# Patient Record
Sex: FEMALE | Race: WHITE | NOT HISPANIC OR LATINO | Employment: OTHER | URBAN - NONMETROPOLITAN AREA
[De-identification: names, ages, dates, MRNs, and addresses within clinical notes are randomized per-mention and may not be internally consistent; named-entity substitution may affect disease eponyms.]

---

## 2019-11-04 ENCOUNTER — OFFICE VISIT (OUTPATIENT)
Dept: PEDIATRICS | Facility: OTHER | Age: 54
End: 2019-11-04
Attending: INTERNAL MEDICINE
Payer: COMMERCIAL

## 2019-11-04 VITALS
BODY MASS INDEX: 27.6 KG/M2 | DIASTOLIC BLOOD PRESSURE: 70 MMHG | TEMPERATURE: 98 F | WEIGHT: 150 LBS | HEIGHT: 62 IN | OXYGEN SATURATION: 99 % | HEART RATE: 94 BPM | SYSTOLIC BLOOD PRESSURE: 124 MMHG | RESPIRATION RATE: 16 BRPM

## 2019-11-04 DIAGNOSIS — E03.9 HYPOTHYROIDISM, UNSPECIFIED TYPE: Primary | ICD-10-CM

## 2019-11-04 DIAGNOSIS — M79.7 FIBROMYALGIA: ICD-10-CM

## 2019-11-04 PROCEDURE — 99203 OFFICE O/P NEW LOW 30 MIN: CPT | Performed by: INTERNAL MEDICINE

## 2019-11-04 RX ORDER — THYROID 60 MG/1
2 TABLET ORAL DAILY
COMMUNITY
Start: 2019-10-21 | End: 2019-11-04

## 2019-11-04 RX ORDER — THYROID 60 MG/1
120 TABLET ORAL DAILY
Qty: 180 TABLET | Refills: 3 | Status: SHIPPED | OUTPATIENT
Start: 2019-11-04 | End: 2020-10-19

## 2019-11-04 SDOH — HEALTH STABILITY: MENTAL HEALTH: HOW OFTEN DO YOU HAVE A DRINK CONTAINING ALCOHOL?: NEVER

## 2019-11-04 ASSESSMENT — MIFFLIN-ST. JEOR: SCORE: 1233.65

## 2019-11-04 ASSESSMENT — PAIN SCALES - GENERAL: PAINLEVEL: NO PAIN (0)

## 2019-11-04 NOTE — NURSING NOTE
Patient presents to clinic for medication questions.  Terra Brannon LPN ....................  11/4/2019   10:00 AM    No LMP recorded.  Medication Reconciliation: complete    Terra Brannon LPN  11/4/2019 10:00 AM

## 2019-11-04 NOTE — PROGRESS NOTES
Subjective  Geo Briggs is a 54 year old female who presents for refill thyroid medication.  She was diagnosed with a thyroid condition back in  after developing extreme fatigue, feeling cold, constipation, dry hair and skin.  She was intolerant of levothyroxine because it has a dairy component and she is lactose intolerant.  She was switched to Post Thyroid and had significant improvement in symptoms.  She was previously seeing a physician in [place] but they did not feel comfortable continuing to prescribe Post Thyroid any longer.  She also has a history of fibromyalgia but wonders if it was misdiagnosed as this was diagnosed prior to her thyroid diagnosis.  She is been feeling very well on NP thyroid 120 mg daily.  No heart palpitations.  No diarrhea.  No dysphasia.    Problem List/PMH: reviewed in EMR, and made relevant updates today.  Medications: reviewed in EMR, and made relevant updates today.  Allergies: reviewed in EMR, and made relevant updates today.    Social Hx:  Social History     Tobacco Use     Smoking status: Never Smoker     Smokeless tobacco: Never Used   Substance Use Topics     Alcohol use: Never     Frequency: Never     Drug use: Never     Social History     Patient does not qualify to have social determinant information on file (likely too young).   Social History Narrative    .  She works as a cook.     I reviewed social history and made relevant updates today.    Family Hx:   Family History   Problem Relation Age of Onset     Heart Disease Father 32        Heart Disease,MI,      Heart Disease Mother         Heart Disease,arteriosclerotic heart disease,     Other - See Comments Mother         Psychiatric illness,Chronic depression/peripheral vascular disease with amputation, smoker.     Hyperlipidemia Other         Hyperlipidemia,hypertriglyceridemia/Hyperlipidemia,hypercholesterolemia.     Other - See Comments Maternal Grandfather         Peripheral vascular disease,   "bilateral amputation.       Objective  Vitals: reviewed in EMR.  /70 (BP Location: Right arm, Patient Position: Sitting, Cuff Size: Adult Large)   Pulse 94   Temp 98  F (36.7  C) (Tympanic)   Resp 16   Ht 1.575 m (5' 2\")   Wt 68 kg (150 lb)   SpO2 99%   Breastfeeding? No   BMI 27.44 kg/m      Gen: Pleasant female, NAD.  HEENT: MMM  Neck: Supple.  No thyromegaly.  No thyroid nodules.  Pulm: Breathing easily  Neuro: Grossly intact  Skin: No concerning lesions.  Psychiatric: Normal affect and insight. Does not appear anxious or depressed.    Lab work reviewed via care everywhere October 17, 2019  TSH less than 0.015  Free T3 3.13  Free T4 0.55    Assessment    ICD-10-CM    1. Hypothyroidism, unspecified type E03.9 thyroid ([facility]) 60 MG tablet   2. Fibromyalgia M79.7      She appears to have iatrogenic hyperthyroidism from a blood work perspective.  Symptomatically she feels great and is having no side effects.  We discussed the risks and benefits of the use of NP thyroid and she wishes to continue its use.  I also discussed the possibility of underlying central hypothyroidism.  She reports having a negative work-up for this in the past including an ACT stim test which I am not able to see at this time.  We discussed the possibility of obtaining thyroid ultrasound and the patient declined.    Plan   -- Expected clinical course discussed   -- Medications and their side effects discussed   -- Continue NP Thyroid   -- Follow-up in 1 year    Signed, Odin Cervantes MD, FAAP, FACP  Internal Medicine & Pediatrics    "

## 2020-04-07 DIAGNOSIS — M25.50 PAIN IN JOINT, MULTIPLE SITES: ICD-10-CM

## 2020-04-07 DIAGNOSIS — E53.8 VITAMIN B12 DEFICIENCY (NON ANEMIC): ICD-10-CM

## 2020-04-07 DIAGNOSIS — E27.9 ADRENAL ABNORMALITY (H): ICD-10-CM

## 2020-04-07 DIAGNOSIS — E55.9 VITAMIN D DEFICIENCY: Primary | ICD-10-CM

## 2020-04-08 DIAGNOSIS — E53.8 BIOTIN-(PROPIONYL-COA-CARBOXYLASE) LIGASE DEFICIENCY: ICD-10-CM

## 2020-04-08 DIAGNOSIS — I15.2 ADRENAL HYPERTENSION (H): ICD-10-CM

## 2020-04-08 DIAGNOSIS — M25.50 PAIN IN JOINT, MULTIPLE SITES: ICD-10-CM

## 2020-04-08 DIAGNOSIS — E55.9 AVITAMINOSIS D: Primary | ICD-10-CM

## 2020-04-08 DIAGNOSIS — E53.8 VITAMIN B12 DEFICIENCY (NON ANEMIC): ICD-10-CM

## 2020-04-08 DIAGNOSIS — E27.9 ADRENAL HYPERTENSION (H): ICD-10-CM

## 2020-05-18 DIAGNOSIS — M25.50 PAIN IN JOINT, MULTIPLE SITES: ICD-10-CM

## 2020-05-18 DIAGNOSIS — E55.9 AVITAMINOSIS D: ICD-10-CM

## 2020-05-18 DIAGNOSIS — E27.9 ADRENAL HYPERTENSION (H): ICD-10-CM

## 2020-05-18 DIAGNOSIS — I15.2 ADRENAL HYPERTENSION (H): ICD-10-CM

## 2020-05-18 DIAGNOSIS — E53.8 VITAMIN B12 DEFICIENCY (NON ANEMIC): ICD-10-CM

## 2020-05-18 LAB
CORTIS SERPL-MCNC: 14.3 UG/DL (ref 4–22)
DEPRECATED CALCIDIOL+CALCIFEROL SERPL-MC: 74.1 NG/ML
ERYTHROCYTE [SEDIMENTATION RATE] IN BLOOD BY WESTERGREN METHOD: 8 MM/H (ref 1–15)
URATE SERPL-MCNC: 5 MG/DL (ref 4.4–7.6)
VIT B12 SERPL-MCNC: 738 PG/ML (ref 180–914)

## 2020-05-18 PROCEDURE — 82306 VITAMIN D 25 HYDROXY: CPT | Mod: ZL

## 2020-05-18 PROCEDURE — 82533 TOTAL CORTISOL: CPT | Mod: ZL

## 2020-05-18 PROCEDURE — 36415 COLL VENOUS BLD VENIPUNCTURE: CPT | Mod: ZL

## 2020-05-18 PROCEDURE — 85652 RBC SED RATE AUTOMATED: CPT | Mod: ZL

## 2020-05-18 PROCEDURE — 82627 DEHYDROEPIANDROSTERONE: CPT | Mod: ZL

## 2020-05-18 PROCEDURE — 84550 ASSAY OF BLOOD/URIC ACID: CPT | Mod: ZL

## 2020-05-18 PROCEDURE — 82607 VITAMIN B-12: CPT | Mod: ZL

## 2020-05-20 LAB — DHEA-S SERPL-MCNC: 163 UG/DL (ref 35–430)

## 2020-09-15 DIAGNOSIS — F43.9 STATE OF STRESS: ICD-10-CM

## 2020-09-15 DIAGNOSIS — I15.2 ADRENAL HYPERTENSION (H): ICD-10-CM

## 2020-09-15 DIAGNOSIS — E53.8 VITAMIN B12 DEFICIENCY (NON ANEMIC): ICD-10-CM

## 2020-09-15 DIAGNOSIS — M25.50 PAIN IN JOINT, MULTIPLE SITES: ICD-10-CM

## 2020-09-15 DIAGNOSIS — E27.9 ADRENAL HYPERTENSION (H): ICD-10-CM

## 2020-09-15 DIAGNOSIS — G93.32 CHRONIC FATIGUE SYNDROME: ICD-10-CM

## 2020-09-15 DIAGNOSIS — E27.9 ADRENAL ABNORMALITY (H): ICD-10-CM

## 2020-09-15 DIAGNOSIS — E55.9 VITAMIN D DEFICIENCY: ICD-10-CM

## 2020-09-15 LAB
CORTIS SERPL-MCNC: 9.1 UG/DL (ref 4–22)
DEPRECATED CALCIDIOL+CALCIFEROL SERPL-MC: 67.5 NG/ML
ERYTHROCYTE [SEDIMENTATION RATE] IN BLOOD BY WESTERGREN METHOD: 11 MM/H (ref 1–15)
URATE SERPL-MCNC: 5.7 MG/DL (ref 4.4–7.6)
VIT B12 SERPL-MCNC: 1206 PG/ML (ref 180–914)

## 2020-09-15 PROCEDURE — 85652 RBC SED RATE AUTOMATED: CPT | Mod: ZL

## 2020-09-15 PROCEDURE — 84550 ASSAY OF BLOOD/URIC ACID: CPT | Mod: ZL

## 2020-09-15 PROCEDURE — 36415 COLL VENOUS BLD VENIPUNCTURE: CPT | Mod: ZL

## 2020-09-15 PROCEDURE — 82306 VITAMIN D 25 HYDROXY: CPT | Mod: ZL

## 2020-09-15 PROCEDURE — 82607 VITAMIN B-12: CPT | Mod: ZL

## 2020-09-15 PROCEDURE — 82627 DEHYDROEPIANDROSTERONE: CPT | Mod: ZL

## 2020-09-15 PROCEDURE — 82533 TOTAL CORTISOL: CPT | Mod: ZL

## 2020-09-16 LAB — DHEA-S SERPL-MCNC: 169 UG/DL (ref 35–430)

## 2020-10-19 ENCOUNTER — TELEPHONE (OUTPATIENT)
Dept: PEDIATRICS | Facility: OTHER | Age: 55
End: 2020-10-19

## 2020-10-19 DIAGNOSIS — E03.9 HYPOTHYROIDISM, UNSPECIFIED TYPE: Primary | ICD-10-CM

## 2020-10-19 RX ORDER — THYROID 60 MG/1
120 TABLET ORAL DAILY
Qty: 180 TABLET | Refills: 3 | Status: SHIPPED | OUTPATIENT
Start: 2020-10-19 | End: 2021-10-04

## 2020-10-19 NOTE — TELEPHONE ENCOUNTER
Patient last saw you 11/4/2019 for a refill on thyroid medication. Patient has no PCP listed. Would you be willing to order thyroid lab work without seeing patient for an appointment?    Lorenza Angel CMA on 10/19/2020 at 12:52 PM

## 2020-10-19 NOTE — TELEPHONE ENCOUNTER
After verifying pts name and date of birth with pt, pt was notified Rx was sent to pharmacy.  Abby Jones LPN

## 2020-10-19 NOTE — TELEPHONE ENCOUNTER
Reason for call: Request a lab order.    Order requested for what kind of lab? Thyroid check  Who is your PCP?     Preferred method for responding to this message: Telephone Call    Phone number patient can be reached at: Cell number on file:    Telephone Information:   Mobile [phone]       If we cannot reach you directly, may we leave a detailed response at the number you provided? Yes

## 2020-10-21 ENCOUNTER — MYC MEDICAL ADVICE (OUTPATIENT)
Dept: PEDIATRICS | Facility: OTHER | Age: 55
End: 2020-10-21

## 2020-10-21 ENCOUNTER — TELEPHONE (OUTPATIENT)
Dept: PEDIATRICS | Facility: OTHER | Age: 55
End: 2020-10-21

## 2020-10-21 DIAGNOSIS — E03.9 HYPOTHYROIDISM, UNSPECIFIED TYPE: ICD-10-CM

## 2020-10-22 NOTE — TELEPHONE ENCOUNTER
Incorrect.  We sent 60 mg tablets, take 120 mg daily.     -- Contact pharmacist    Signed, Odin Cervantes MD, FAAP, FACP  Internal Medicine & Pediatrics

## 2020-12-27 ENCOUNTER — HEALTH MAINTENANCE LETTER (OUTPATIENT)
Age: 55
End: 2020-12-27

## 2021-09-09 ENCOUNTER — TELEPHONE (OUTPATIENT)
Dept: PEDIATRICS | Facility: OTHER | Age: 56
End: 2021-09-09

## 2021-09-09 DIAGNOSIS — E03.9 HYPOTHYROIDISM, UNSPECIFIED TYPE: Primary | ICD-10-CM

## 2021-09-09 NOTE — TELEPHONE ENCOUNTER
I called the patient and the patient stated they have enough medication to last until at least Monday. The patient did not know if Dr. Cervantes would want the patient to have labs drawn before the medication gets refilled.    Jonathon Mccabe LPN on 9/9/2021 at 4:20 PM

## 2021-09-13 NOTE — TELEPHONE ENCOUNTER
ICD-10-CM    1. Hypothyroidism, unspecified type  E03.9 TSH     T4, Free       -- lab   -- schedule ov    Signed, Odin Cervantes MD, FAAP, FACP  Internal Medicine & Pediatrics

## 2021-09-13 NOTE — TELEPHONE ENCOUNTER
Patient notified to schedule an office visit and lab work to get rx filled.  Priscila Alvarado LPN.........................9/13/2021  10:10 AM

## 2021-09-28 ENCOUNTER — LAB (OUTPATIENT)
Dept: LAB | Facility: OTHER | Age: 56
End: 2021-09-28
Attending: INTERNAL MEDICINE
Payer: COMMERCIAL

## 2021-09-28 DIAGNOSIS — E27.9 ADRENAL ABNORMALITY (H): Primary | ICD-10-CM

## 2021-09-28 DIAGNOSIS — E03.9 HYPOTHYROIDISM, UNSPECIFIED TYPE: ICD-10-CM

## 2021-09-28 LAB
T3FREE SERPL-MCNC: 3.5 PG/ML (ref 2.5–3.9)
T4 FREE SERPL-MCNC: 0.55 NG/DL (ref 0.6–1.6)
TSH SERPL DL<=0.005 MIU/L-ACNC: 0.01 MU/L (ref 0.4–4)

## 2021-09-28 PROCEDURE — 84439 ASSAY OF FREE THYROXINE: CPT | Mod: ZL

## 2021-09-28 PROCEDURE — 84481 FREE ASSAY (FT-3): CPT | Mod: ZL

## 2021-09-28 PROCEDURE — 36415 COLL VENOUS BLD VENIPUNCTURE: CPT | Mod: ZL

## 2021-09-28 PROCEDURE — 84443 ASSAY THYROID STIM HORMONE: CPT | Mod: ZL

## 2021-10-03 ENCOUNTER — HEALTH MAINTENANCE LETTER (OUTPATIENT)
Age: 56
End: 2021-10-03

## 2021-10-04 ENCOUNTER — OFFICE VISIT (OUTPATIENT)
Dept: PEDIATRICS | Facility: OTHER | Age: 56
End: 2021-10-04
Attending: INTERNAL MEDICINE
Payer: COMMERCIAL

## 2021-10-04 VITALS
HEIGHT: 62 IN | BODY MASS INDEX: 26.04 KG/M2 | TEMPERATURE: 99 F | WEIGHT: 141.5 LBS | HEART RATE: 132 BPM | SYSTOLIC BLOOD PRESSURE: 144 MMHG | RESPIRATION RATE: 20 BRPM | OXYGEN SATURATION: 97 % | DIASTOLIC BLOOD PRESSURE: 88 MMHG

## 2021-10-04 DIAGNOSIS — R00.0 TACHYCARDIA: ICD-10-CM

## 2021-10-04 DIAGNOSIS — E03.9 HYPOTHYROIDISM, UNSPECIFIED TYPE: Primary | ICD-10-CM

## 2021-10-04 PROCEDURE — 99214 OFFICE O/P EST MOD 30 MIN: CPT | Performed by: INTERNAL MEDICINE

## 2021-10-04 RX ORDER — LEVOTHYROXINE, LIOTHYRONINE 76; 18 UG/1; UG/1
120 TABLET ORAL DAILY
COMMUNITY
Start: 2020-10-22 | End: 2021-10-04

## 2021-10-04 RX ORDER — LEVOTHYROXINE, LIOTHYRONINE 76; 18 UG/1; UG/1
120 TABLET ORAL DAILY
Qty: 90 TABLET | Refills: 3 | Status: SHIPPED | OUTPATIENT
Start: 2021-10-04 | End: 2022-07-13

## 2021-10-04 ASSESSMENT — PAIN SCALES - GENERAL: PAINLEVEL: NO PAIN (0)

## 2021-10-04 ASSESSMENT — MIFFLIN-ST. JEOR: SCORE: 1177.15

## 2021-10-04 NOTE — PATIENT INSTRUCTIONS
-- No change to thyroid medicine   -- Consider pituitary MRI to workup central hypothyroidism     -- Consider EKG   -- Monitor BP and HR at home, notify Dr. Cervantes in a week or so

## 2021-10-04 NOTE — NURSING NOTE
"Patient presents to the clinic for follow up with lab.      FOOD SECURITY SCREENING QUESTIONS  Hunger Vital Signs:  Within the past 12 months we worried whether our food would run out before we got money to buy more. Never  Within the past 12 months the food we bought just didn't last and we didn't have money to get more. Never    Advance Care Directive on file? no  Advance Care Directive provided to patient? Declined.      Chief Complaint   Patient presents with     Clinic Care Coordination - Follow-up       Initial BP (!) 144/88 (BP Location: Right arm, Patient Position: Sitting, Cuff Size: Adult Regular)   Pulse (!) 132   Temp 99  F (37.2  C) (Tympanic)   Resp 20   Ht 1.562 m (5' 1.5\")   Wt 64.2 kg (141 lb 8 oz)   SpO2 97%   BMI 26.30 kg/m   Estimated body mass index is 26.3 kg/m  as calculated from the following:    Height as of this encounter: 1.562 m (5' 1.5\").    Weight as of this encounter: 64.2 kg (141 lb 8 oz).  Medication Reconciliation: complete    Jovana Lane, NICHOLAS       "

## 2021-10-04 NOTE — PROGRESS NOTES
"Subjective   Geo Briggs is a 56 year old female who presents for follow-up thyroid.  She has a history of hypothyroidism which has been stable on NP thyroid.  She cannot tolerate [place] Thyroid because it contains lactose in its formulation to which she is intolerant.  She is hoping I will say she continue her thyroid medication because she feels very well.  She does not want to go back to feeling ill before she got her medication.  She thinks her heart rate is fast today because of anxiety.  She checks her blood pressure and pulse at home and they have been normal.  She had a work-up for her thyroid a number of years ago in [place].  She does not remember if she got brain imaging.    Objective   Vitals: BP (!) 144/88 (BP Location: Right arm, Patient Position: Sitting, Cuff Size: Adult Regular)   Pulse (!) 132   Temp 99  F (37.2  C) (Tympanic)   Resp 20   Ht 1.562 m (5' 1.5\")   Wt 64.2 kg (141 lb 8 oz)   SpO2 97%   BMI 26.30 kg/m      Cardiovascular: Tachycardic, regular.  Pulmonary: Clear    Review and Analysis of Data   I personally reviewed the following:  External notes: No  Results: Yes Thyroid labs are reviewed.  I attempted to review outside labs via care everywhere no imaging results of brain are found.  Use of an independent historian: No  Independent review of a test performed by another physician: No  Discussion of management with another physician: No  Low risk of morbidity from additional diagnostic testing and/or treatment.    Assessment & Plan   1. Hypothyroidism, unspecified type  In reviewing the pattern of her thyroid labs she has a normal free T3 and only slightly reduced free T4 with significantly low TSH.  I am concerned this may represent central hypothyroidism.  We discussed the possibility for pituitary diseases including but not limited to a pituitary adenoma.  I recommended obtaining an MRI of her brain as a next step, which the patient declined at this time.  However warning " signs were discussed and prompt repeat evaluation recommended if symptoms persist or worsen.    - NP THYROID 120 MG tablet; Take 1 tablet (120 mg) by mouth daily Generic form for [place] with NO Lactose  Dispense: 90 tablet; Refill: 3    2. Tachycardia  Her heart rate is fast today.  Etiology is uncertain.  I recommended an EKG which the patient declined.  I have asked her to monitor her vital signs for me at home and notify me after a week.    Patient Instructions    -- No change to thyroid medicine   -- Consider pituitary MRI to workup central hypothyroidism     -- Consider EKG   -- Monitor BP and HR at home, notify Dr. Cervantes in a week or so      Return in about 1 year (around 10/4/2022) for med management.    Signed, Odin Cervantes MD, FAAP, FACP  Internal Medicine & Pediatrics

## 2021-10-10 DIAGNOSIS — E03.9 HYPOTHYROIDISM, UNSPECIFIED TYPE: Primary | ICD-10-CM

## 2021-10-11 RX ORDER — LEVOTHYROXINE, LIOTHYRONINE 38; 9 UG/1; UG/1
TABLET ORAL
Qty: 180 TABLET | Refills: 4 | Status: SHIPPED | OUTPATIENT
Start: 2021-10-11 | End: 2022-09-27

## 2022-01-23 ENCOUNTER — HEALTH MAINTENANCE LETTER (OUTPATIENT)
Age: 57
End: 2022-01-23

## 2022-07-12 DIAGNOSIS — E03.9 HYPOTHYROIDISM, UNSPECIFIED TYPE: ICD-10-CM

## 2022-07-13 NOTE — TELEPHONE ENCOUNTER
"  Last Prescription Date: 10/4/21  Last Qty/Refills: 90 / 3  Last Office Visit: 10/4/21 Cervantes  Future Office Visit: none     Requested Prescriptions   Pending Prescriptions Disp Refills     NP THYROID 120 MG tablet [Pharmacy Med Name: THYROID NP 2GR (120MG) TABLETS] 90 tablet 3     Sig: TAKE 1 TABLET BY MOUTH ONCE DAILY       Thyroid Protocol Failed - 7/12/2022  8:06 AM        Failed - Normal TSH on file in past 12 months     Recent Labs   Lab Test 09/28/21  0916   TSH 0.01*              Passed - Patient is 12 years or older        Passed - Recent (12 mo) or future (30 days) visit within the authorizing provider's specialty     Patient has had an office visit with the authorizing provider or a provider within the authorizing providers department within the previous 12 mos or has a future within next 30 days. See \"Patient Info\" tab in inbasket, or \"Choose Columns\" in Meds & Orders section of the refill encounter.              Passed - Medication is active on med list        Passed - No active pregnancy on record     If patient is pregnant or has had a positive pregnancy test, please check TSH.          Passed - No positive pregnancy test in past 12 months     If patient is pregnant or has had a positive pregnancy test, please check TSH.               "

## 2022-07-14 RX ORDER — LEVOTHYROXINE, LIOTHYRONINE 76; 18 UG/1; UG/1
TABLET ORAL
Qty: 90 TABLET | Refills: 1 | Status: SHIPPED | OUTPATIENT
Start: 2022-07-14 | End: 2022-09-27

## 2022-08-24 ENCOUNTER — TRANSFERRED RECORDS (OUTPATIENT)
Dept: MULTI SPECIALTY CLINIC | Facility: CLINIC | Age: 57
End: 2022-08-24

## 2022-08-24 LAB
HPV ABSTRACT: NORMAL
PAP-ABSTRACT: NORMAL

## 2022-09-11 ENCOUNTER — HEALTH MAINTENANCE LETTER (OUTPATIENT)
Age: 57
End: 2022-09-11

## 2022-09-12 ENCOUNTER — TELEPHONE (OUTPATIENT)
Dept: PEDIATRICS | Facility: OTHER | Age: 57
End: 2022-09-12

## 2022-09-12 DIAGNOSIS — E03.9 HYPOTHYROIDISM, UNSPECIFIED TYPE: Primary | ICD-10-CM

## 2022-09-12 NOTE — TELEPHONE ENCOUNTER
The patient would like labs for thyroid, TSH, free T3. Free T4 put in for her for an appointment on September 27, 2022.  Please call her when these are put in so she can make an appointment for lab.

## 2022-09-15 NOTE — TELEPHONE ENCOUNTER
Left message to call back.  Layla Richardson CMA ([facility])   9/15/2022   12:34 PM

## 2022-09-19 NOTE — TELEPHONE ENCOUNTER
After confirming last name and date of birth, informed Geo of lab orders placed. She is scheduled for the 23rd. Silva Linton on 9/19/2022 at 9:35 AM

## 2022-09-23 ENCOUNTER — LAB (OUTPATIENT)
Dept: LAB | Facility: OTHER | Age: 57
End: 2022-09-23
Attending: INTERNAL MEDICINE
Payer: COMMERCIAL

## 2022-09-23 DIAGNOSIS — E03.9 HYPOTHYROIDISM, UNSPECIFIED TYPE: ICD-10-CM

## 2022-09-23 LAB
T3FREE SERPL-MCNC: 2 PG/ML (ref 2–4.4)
T4 FREE SERPL-MCNC: 0.44 NG/DL (ref 0.6–1.6)
TSH SERPL DL<=0.005 MIU/L-ACNC: 0.11 MU/L (ref 0.4–4)

## 2022-09-23 PROCEDURE — 84481 FREE ASSAY (FT-3): CPT | Mod: ZL

## 2022-09-23 PROCEDURE — 84439 ASSAY OF FREE THYROXINE: CPT | Mod: ZL

## 2022-09-23 PROCEDURE — 84443 ASSAY THYROID STIM HORMONE: CPT | Mod: ZL

## 2022-09-23 PROCEDURE — 36415 COLL VENOUS BLD VENIPUNCTURE: CPT | Mod: ZL

## 2022-09-27 ENCOUNTER — OFFICE VISIT (OUTPATIENT)
Dept: PEDIATRICS | Facility: OTHER | Age: 57
End: 2022-09-27
Attending: INTERNAL MEDICINE
Payer: COMMERCIAL

## 2022-09-27 VITALS
TEMPERATURE: 99.4 F | WEIGHT: 142.4 LBS | BODY MASS INDEX: 26.2 KG/M2 | SYSTOLIC BLOOD PRESSURE: 142 MMHG | HEIGHT: 62 IN | RESPIRATION RATE: 22 BRPM | HEART RATE: 144 BPM | OXYGEN SATURATION: 99 % | DIASTOLIC BLOOD PRESSURE: 88 MMHG

## 2022-09-27 DIAGNOSIS — E03.9 HYPOTHYROIDISM, UNSPECIFIED TYPE: Primary | ICD-10-CM

## 2022-09-27 DIAGNOSIS — R00.0 TACHYCARDIA: ICD-10-CM

## 2022-09-27 PROCEDURE — 99214 OFFICE O/P EST MOD 30 MIN: CPT | Performed by: INTERNAL MEDICINE

## 2022-09-27 RX ORDER — LEVOTHYROXINE, LIOTHYRONINE 76; 18 UG/1; UG/1
120 TABLET ORAL DAILY
Qty: 90 TABLET | Refills: 3 | Status: SHIPPED | OUTPATIENT
Start: 2022-09-27

## 2022-09-27 ASSESSMENT — PAIN SCALES - GENERAL: PAINLEVEL: NO PAIN (0)

## 2022-09-27 NOTE — PROGRESS NOTES
"Assessment & Plan   1. Hypothyroidism, unspecified type  I continued to express concern about the pattern with regards to her thyroid labs.  The pattern of suppressed TSH, low T4 and normal T3 have me raise the concern about the possibility of central hypothyroidism and possibly a pituitary problem.  She believes this is unlikely since her recent eye exam was normal.  I offered an MRI of the brain, additional lab work and expert consultation which the patient declined.  - NP THYROID 120 MG tablet; Take 1 tablet (120 mg) by mouth daily  Dispense: 90 tablet; Refill: 3    2. Tachycardia  I believe her heart rate is sinus tachycardia although EKG was not obtained today.  I recommended an EKG which was declined by the patient.  We discussed the possibility of atrial fibrillation with rapid ventricular response, risk for stroke.  She declined further investigation.    Signed, Odin Cervantes MD, FAAP, FACP  Internal Medicine & Pediatrics    Subjective   Geo Briggs is a 57 year old female who presents for follow-up thyroid.  She is feeling great lately.  She sleeps well.  She has no exercise intolerance.  She brought a list of home blood pressures.  Her pulses always been below 100.  She continues on [place] Thyroid.          Objective   Vitals: BP (!) 142/88   Pulse (!) 144   Temp 99.4  F (37.4  C) (Tympanic)   Resp 22   Ht 1.58 m (5' 2.21\")   Wt 64.6 kg (142 lb 6.4 oz)   SpO2 99%   Breastfeeding No   BMI 25.87 kg/m       Cardiovascular: Tachycardic, regular.  No murmur.    Review and Analysis of Data   I personally reviewed the following:  External notes: No  Results: Yes Most recent lab work is reviewed  Use of an independent historian: No  Independent review of a test performed by another physician: No  Discussion of management with another physician: No  Moderate risk of morbidity from additional diagnostic testing and/or treatment.      "

## 2022-09-27 NOTE — NURSING NOTE
Patient here today for thyroid panel.   Preethi Laguna LPN....................  9/27/2022   2:57 PM

## 2022-10-03 ENCOUNTER — MYC MEDICAL ADVICE (OUTPATIENT)
Dept: FAMILY MEDICINE | Facility: OTHER | Age: 57
End: 2022-10-03

## 2022-10-03 DIAGNOSIS — Z53.20 COLONOSCOPY REFUSED: Primary | ICD-10-CM

## 2023-01-22 ENCOUNTER — HEALTH MAINTENANCE LETTER (OUTPATIENT)
Age: 58
End: 2023-01-22

## 2023-04-21 ENCOUNTER — PATIENT OUTREACH (OUTPATIENT)
Dept: PEDIATRICS | Facility: OTHER | Age: 58
End: 2023-04-21

## 2023-04-21 DIAGNOSIS — Z12.31 BREAST CANCER SCREENING BY MAMMOGRAM: Primary | ICD-10-CM

## 2023-04-21 NOTE — LETTER
Scheduling Hours   Phone:     [place]: Monday- Friday 7:30am - 5pm   [place]: Monday - Friday 7:30am - 4pm     Rossana Guardado,    This is a reminder that it is time to make your appointment for your annual mammogram.  You may make an appointment for your Digital Mammogram by calling our scheduling line [phone] or [phone]     If you are experiencing breast symptoms or concerns such as a new lump or breast pain you should first contact your health care provider before scheduling your mammogram. Your clinician may want to see you prior to your visit.     As you know, early detection of cancer is very important. Currently the American College of Radiology and the Society of Breast Imaging recommend an annual mammogram for all women beginning at the age of 40.     If you are a patient currently enrolled in the [facility] or the [facility], you must be seen by your provider for a clinical breast examination prior to your mammogram.     If you have already made an appointment, please disregard this letter.     Thank you and we look forward to seeing you in the near future for your annual screening mammogram.     Sincerely,     Your Care Team  [facility]

## 2023-04-21 NOTE — TELEPHONE ENCOUNTER
ACC Review   Ms. Geo Briggs is due for a mammogram.      ICD-10-CM    1. Breast cancer screening by mammogram  Z12.31 MA Screening Digital Bilateral        Orders Placed This Encounter   Procedures     MA Screening Digital Bilateral       Signed, Odin Cervantes MD, FAAP, FACP  Internal Medicine & Pediatrics

## 2023-10-07 ENCOUNTER — HEALTH MAINTENANCE LETTER (OUTPATIENT)
Age: 58
End: 2023-10-07

## 2024-11-24 ENCOUNTER — HEALTH MAINTENANCE LETTER (OUTPATIENT)
Age: 59
End: 2024-11-24